# Patient Record
Sex: FEMALE | Race: WHITE | Employment: FULL TIME | URBAN - METROPOLITAN AREA
[De-identification: names, ages, dates, MRNs, and addresses within clinical notes are randomized per-mention and may not be internally consistent; named-entity substitution may affect disease eponyms.]

---

## 2017-02-23 PROCEDURE — 88142 CYTOPATH C/V THIN LAYER: CPT | Performed by: OBSTETRICS & GYNECOLOGY

## 2017-02-24 ENCOUNTER — HOSPITAL ENCOUNTER (OUTPATIENT)
Dept: LAB | Age: 52
Discharge: HOME | End: 2017-02-23
Payer: COMMERCIAL

## 2017-05-17 ENCOUNTER — HOSPITAL ENCOUNTER (OUTPATIENT)
Dept: LAB | Age: 52
Discharge: HOME | End: 2017-05-17
Payer: COMMERCIAL

## 2017-05-17 PROCEDURE — 88142 CYTOPATH C/V THIN LAYER: CPT | Performed by: NURSE PRACTITIONER

## 2017-08-30 ENCOUNTER — HOSPITAL ENCOUNTER (OUTPATIENT)
Dept: LAB | Age: 52
Discharge: HOME | End: 2017-08-30
Payer: COMMERCIAL

## 2017-08-30 PROCEDURE — 88142 CYTOPATH C/V THIN LAYER: CPT | Performed by: OBSTETRICS & GYNECOLOGY

## 2018-01-26 ENCOUNTER — HOSPITAL ENCOUNTER (OUTPATIENT)
Dept: LAB | Age: 53
Discharge: HOME | End: 2018-01-26
Payer: COMMERCIAL

## 2018-01-26 PROCEDURE — 88142 CYTOPATH C/V THIN LAYER: CPT | Performed by: OBSTETRICS & GYNECOLOGY

## 2018-02-21 ENCOUNTER — HOSPITAL ENCOUNTER (OUTPATIENT)
Dept: PHYSICAL THERAPY | Age: 53
Discharge: HOME | End: 2018-02-21
Payer: COMMERCIAL

## 2018-02-21 DIAGNOSIS — C54.1 ENDOMETRIAL CANCER (HCC): ICD-10-CM

## 2018-02-21 PROCEDURE — 97161 PT EVAL LOW COMPLEX 20 MIN: CPT

## 2018-02-21 NOTE — PROGRESS NOTES
Ambulatory/Rehab Services H2 Model Falls Risk Assessment    Risk Factor Pts. ·   Confusion/Disorientation/Impulsivity  []    4 ·   Symptomatic Depression  []   2 ·   Altered Elimination  []   1 ·   Dizziness/Vertigo  []   1 ·   Gender (Male)  []   1 ·   Any administered antiepileptics (anticonvulsants):  []   2 ·   Any administered benzodiazepines:  []   1 ·   Visual Impairment (specify):  []   1 ·   Portable Oxygen Use  []   1 ·   Orthostatic ? BP  []   1 ·   History of Recent Falls (within 3 mos.)  []   5     Ability to Rise from Chair (choose one) Pts. ·   Ability to rise in a single movement  [x]   0 ·   Pushes up, successful in one attempt  []   1 ·   Multiple attempts, but successful  []   3 ·   Unable to rise without assistance  []   4   Total: (5 or greater = High Risk) 0     Falls Prevention Plan:   []                Physical Limitations to Exercise (specify):   []                Mobility Assistance Device (type):   []                Exercise/Equipment Adaptation (specify):    ©2010 [employer] of [address] Patent #[id number].  Federal Law prohibits the replication, distribution or use without written permission from [employer]

## 2018-02-21 NOTE — THERAPY EVALUATION
Darion Pfeiffer  : 1965  Primary: [employer] [facility] [employer]*  Secondary:  [address]  at [facility]  [address] , [address], [place] [address]  [phone]   [phone]          OUTPATIENT PHYSICAL THERAPY:Initial Assessment 2018    ICD-10: Treatment Diagnosis: muscle weakness generalized M 62.81  Precautions/Allergies:   Review of patient's allergies indicates not on file. Fall Risk Score: 0 (? 5 = High Risk)  MD Orders: oncology rehab MEDICAL/REFERRING DIAGNOSIS:  Endometrial cancer ([facility] [address] [C54.1]    DATE OF ONSET: 2016  REFERRING PHYSICIAN: Keren Davila MD  RETURN PHYSICIAN APPOINTMENT: 18     INITIAL ASSESSMENT:  Ms. Deniz Chau presents following diagnosis of endometrial cancer in . She underwent a SHELLY/BSO 16. She present deconditioned and wanting to get in shape. She will benefit from a referral to [facility] and a dietician. The patient did not attend her evaluation in proper clothing for all testing. She rescheduled to complete the evaluation next week. PROBLEM LIST (Impacting functional limitations):  1. Decreased Strength  2. Decreased Activity Tolerance  3. Increased Fatigue  4. Decreased [place] with Home Exercise Program INTERVENTIONS PLANNED:  1. Home Exercise Program (HEP)  2. Therapeutic Exercise/Strengthening   TREATMENT PLAN:  Effective Dates: 18 TO 3/21/18. Frequency/Duration: 1 time a week for 4 weeks  GOALS: (Goals have been discussed and agreed upon with patient.)  Short-Term Functional Goals: Time Frame: 2 weeks  1. The patient will participate in a 6 minute walk test within 2 weeks. 2. The patient will participate in a TUG within 2 weeks. 3. The patient will be referred to oncology massage, yoga and dietician within 2 weeks. Discharge Goals: Time Frame: 4 weeks  1. The patient will transition to healthy Self within 4 weeks.   Rehabilitation Potential For Stated Goals: Good  Regarding Jalyn Coto therapy, I certify that the treatment plan above will be carried out by a therapist or under their direction. Thank you for this referral,  Doni Yang PT     Referring Physician Signature: Nathan Mack MD              Date            Do not click here        The information in this section was collected on 18 (except where otherwise noted). HISTORY:   History of Present Injury/Illness (Reason for Referral):  Post hysterectomy 2016, deconditioned  Past Medical History/Comorbidities:   Ms. Jossy Cifuentes  has a past medical history of Cancer ([facility] [address] (2016). Ms. Jossy Cifuentes  has a past surgical history that includes pr lap,tubal cautery; hx  section (); hx tubal ligation (); and hx denita and bso (). 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 Date Last Reviewed:  18   Number of Personal Factors/Comorbidities that affect the Plan of Care: 0: LOW COMPLEXITY   EXAMINATION:   ROM:          Within functional limits  Strength: Within functional limits           Functional Mobility:         Gait/Ambulation:  independent        Transfers:  independent  Balance:          intact   Body Structures Involved:  1. Muscles Body Functions Affected:  1. Neuromusculoskeletal Activities and Participation Affected:  1.  None   Number of elements (examined above) that affect the Plan of Care: 1-2: LOW COMPLEXITY   CLINICAL PRESENTATION:   Presentation: Stable and uncomplicated: LOW COMPLEXITY   CLINICAL DECISION MAKING: Outcome Measure: Tool Used: 6-MINUTE WALK TEST  Score:  Initial:   feet Most Recent: X feet (Date: -- )   Interpretation of Score: Normal range varies but is approximately 0602-9392 Feet      Distance walked:   feet               Baseline End of Test   Heart Rate      Dyspnea (Chris Scale)     Fatigue (Chris Scale)     SpO2     BP       Score 2133 [id number] [id number] [phone] 15-0   Modifier CH CI CJ CK CL CM CN         Tool Used: TINETTI  Score:  Initial:   Gait: 12/12  Balance: 16/16  TOTAL: 28/28 Most Recent:  Gait: /12  Balance: /16  TOTAL: /28   Interpretation of Score: The maximum score for the gait component is 12 points. The maximum score for the balance component is 16 points. The maximum total score is 28 points. In general, patients who score below 19 are at a high risk for falls. Patients who score in the range of 19-24 indicate that the patient has a risk for falls. Score 28 27-23 22-18 17-12 11-6 5-1 0   Modifier CH CI CJ CK CL CM CN         Tool Used: Timed Up and Go (TUG)  Score:  Initial:   seconds Most Recent: X seconds (Date: -- )   Interpretation of Score: The test measures, in seconds, the time taken by an individual to stand up from a standard arm chair (seat height 46 cm [18 in], arm height 65 cm [25.6 in]), walk a distance of 3 meters (118 in, approx 10 ft), turn, walk back to the chair and sit down. If the individual takes longer than 14 seconds to complete TUG, this indicates risk for falls.   Score 7 7.5-10.5 11-14 14.5-17.5 18-21 21.5-24.5 25+   Modifier CH CI CJ CK CL CM CN       Tool Used: ECOG Performance Survey Score  Score:  Initial:  0 Most Recent:      Interpretation of Score:   0 Fully active, able to carry on all pre-disease performance without restriction   1 Restricted in physically strenuous activity but ambulatory and able to carry out work of a light or sedentary nature, e.g., light house work, office work   2 Ambulatory and capable of all selfcare but unable to carry out any work activities. Up and about more than 50% of waking hours   3 Capable of only limited selfcare, confined to bed or chair more than 50% of waking hours   4 Completely disabled. Cannot carry on any selfcare. Totally confined to bed or chair   5 Dead        Medical Necessity:   · Patient is expected to demonstrate progress in strength and conditioning to improve overall function. Reason for Services/Other Comments:  · Patient continues to demonstrate capacity to improve strength and conditioning which will increase independence. Use of outcome tool(s) and clinical judgement create a POC that gives a: Clear prediction of patient's progress: LOW COMPLEXITY            TREATMENT:   (In addition to Assessment/Re-Assessment sessions the following treatments were rendered)  Pre-treatment Symptoms/Complaints:  The patient reports she wants to get into shape. Pain: Initial:     0 Post Session:  0     partial assessment. The patient was not dressed appropriately for full evaluation. Guzu  Treatment/Session Assessment:    · Response to Treatment:  Tolerated the assessment well. · Compliance with Program/Exercises: Will assess as treatment progresses. · Recommendations/Intent for next treatment session: \"Next visit will focus on finish assessment\". Refer to yoga, massage, dietician and [facility].   Total Treatment Duration:       Facundo Pugh, PT

## 2018-02-27 ENCOUNTER — HOSPITAL ENCOUNTER (OUTPATIENT)
Dept: PHYSICAL THERAPY | Age: 53
Discharge: HOME | End: 2018-02-27
Payer: COMMERCIAL

## 2018-02-27 PROCEDURE — 97110 THERAPEUTIC EXERCISES: CPT

## 2018-02-27 NOTE — PROGRESS NOTES
Joseph Lee  : 1965  Primary: [facility]ir[employer] Ang [employer]*  Secondary:  [address]  at [facility]  [address], [address], [place] [address]  [phone]   [phone]          OUTPATIENT PHYSICAL THERAPY:Daily Note 2018    ICD-10: Treatment Diagnosis: muscle weakness generalized M 62.81  Precautions/Allergies:   Review of patient's allergies indicates not on file. Fall Risk Score: 0 (? 5 = High Risk)  MD Orders: oncology rehab MEDICAL/REFERRING DIAGNOSIS:  Endometrial cancer ([facility] [address] [C54.1]    DATE OF ONSET: 2016  REFERRING PHYSICIAN: Efraín Mendieta MD  RETURN PHYSICIAN APPOINTMENT: 18     INITIAL ASSESSMENT:  Ms. Monique Juárez presents following diagnosis of endometrial cancer in . She underwent a SHELLY/BSO 16. She present deconditioned and wanting to get in shape. She will benefit from a referral to [facility] and a dietician. We will initiate strengthening and conditioning and transition her to healthy Self   PROBLEM LIST (Impacting functional limitations):  1. Decreased Strength  2. Decreased Activity Tolerance  3. Increased Fatigue  4. Decreased [place] with Home Exercise Program INTERVENTIONS PLANNED:  1. Home Exercise Program (HEP)  2. Therapeutic Exercise/Strengthening   TREATMENT PLAN:  Effective Dates: 18 TO 3/21/18. Frequency/Duration: 1 time a week for 4 weeks  GOALS: (Goals have been discussed and agreed upon with patient.)  Short-Term Functional Goals: Time Frame: 2 weeks  1. The patient will participate in a 6 minute walk test within 2 weeks. 2. The patient will participate in a TUG within 2 weeks. 3. The patient will be referred to oncology massage, yoga and dietician within 2 weeks. Discharge Goals: Time Frame: 4 weeks  1. The patient will transition to [facility] within 4 weeks.   Rehabilitation Potential For Stated Goals: Good  Regarding Clarita Fisher's therapy, I certify that the treatment plan above will be carried out by a therapist or under their direction. Thank you for this referral,  Amie Landaverde, PT         Do not click here        The information in this section was collected on 18 (except where otherwise noted). HISTORY:   History of Present Injury/Illness (Reason for Referral):  Post hysterectomy 2016, deconditioned  Past Medical History/Comorbidities:   Ms. Escalante  has a past medical history of Cancer ([facility] [address] (2016). Ms. Escalante  has a past surgical history that includes pr lap,tubal cautery; hx  section (); hx tubal ligation (); and hx shelly and bso (). 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 Date Last Reviewed:  18   Number of Personal Factors/Comorbidities that affect the Plan of Care: 0: LOW COMPLEXITY   EXAMINATION:   ROM:          Within functional limits  Strength:  5/5 x 4 etremities           Functional Mobility:         Gait/Ambulation:  independent        Transfers:  independent  Balance:          intact   Body Structures Involved:  1. Muscles Body Functions Affected:  1. Neuromusculoskeletal Activities and Participation Affected:  1. None   Number of elements (examined above) that affect the Plan of Care: 1-2: LOW COMPLEXITY   CLINICAL PRESENTATION:   Presentation: Stable and uncomplicated: LOW COMPLEXITY   CLINICAL DECISION MAKING:   Outcome Measure:    Tool Used: 6-MINUTE WALK TEST  Score:  Initial: 1700  feet Most Recent: X feet (Date: -- )   Interpretation of Score: Normal range varies but is approximately 7074-0012 Feet      Distance walked: 1700  feet               Baseline End of Test   Heart Rate 92  105   Dyspnea (Chris Scale)     Fatigue (Chris Scale) 3-4/10 5/10   SpO2 99 98   /110 automatic 161/103 automatic  160/100 manual  5 min rest  121/92  Advised the patient to discuss this with her physician     Score 2133 2561-5117 1076-1851 [phone] 15-0   Modifier CH CI CJ CK CL CM CN         Tool Used: TINETTI  Score:  Initial:   Gait: 12/12  Balance: 16/16  TOTAL: 28/28 Most Recent:  Gait: /12  Balance: /16  TOTAL: /28   Interpretation of Score: The maximum score for the gait component is 12 points. The maximum score for the balance component is 16 points. The maximum total score is 28 points. In general, patients who score below 19 are at a high risk for falls. Patients who score in the range of 19-24 indicate that the patient has a risk for falls. Score 28 27-23 22-18 17-12 11-6 5-1 0   Modifier CH CI CJ CK CL CM CN         Tool Used: Timed Up and Go (TUG)  Score:  Initial: 7  seconds Most Recent: X seconds (Date: -- )   Interpretation of Score: The test measures, in seconds, the time taken by an individual to stand up from a standard arm chair (seat height 46 cm [18 in], arm height 65 cm [25.6 in]), walk a distance of 3 meters (118 in, approx 10 ft), turn, walk back to the chair and sit down. If the individual takes longer than 14 seconds to complete TUG, this indicates risk for falls.   Score 7 7.5-10.5 11-14 14.5-17.5 18-21 21.5-24.5 25+   Modifier CH CI CJ CK CL CM CN       Tool Used: ECOG Performance Survey Score  Score:  Initial:  0 Most Recent:      Interpretation of Score:   0 Fully active, able to carry on all pre-disease performance without restriction   1 Restricted in physically strenuous activity but ambulatory and able to carry out work of a light or sedentary nature, e.g., light house work, office work   2 Ambulatory and capable of all selfcare but unable to carry out any work activities. Up and about more than 50% of waking hours   3 Capable of only limited selfcare, confined to bed or chair more than 50% of waking hours   4 Completely disabled. Cannot carry on any selfcare. Totally confined to bed or chair   5 Dead        Medical Necessity:   · Patient is expected to demonstrate progress in strength and conditioning to improve overall function. Reason for Services/Other Comments:  · Patient continues to demonstrate capacity to improve strength and conditioning which will increase independence. Use of outcome tool(s) and clinical judgement create a POC that gives a: Clear prediction of patient's progress: LOW COMPLEXITY            TREATMENT:   (In addition to Assessment/Re-Assessment sessions the following treatments were rendered)  Pre-treatment Symptoms/Complaints:  The patient reports she wants to get into shape. Pain: Initial:     0 Post Session:  0   59 minutes  as below    Nustep level 2 x 5 min mets 2.4 spm 73 O2 98   UBE level 1 x 4 min O2 98    Sit to stand x 10 reps O298    6 minute walk, TUG  Given information on yoga, massage and dietician  Fatigue 7/10    [facility] [facility]  Treatment/Session Assessment:    · Response to Treatment:  Tolerated the treatment well. · Compliance with Program/Exercises: Will assess as treatment progresses. · Recommendations/Intent for next treatment session: \"Next visit will focus on advancements to more challenging activities\". Refer to yoga, massage, dietician and [facility].   Total Treatment Duration:  PT Patient Time In/Time Out  Time In: 1026  Time Out: [address], PT

## 2018-03-06 ENCOUNTER — HOSPITAL ENCOUNTER (OUTPATIENT)
Dept: PHYSICAL THERAPY | Age: 53
Discharge: HOME | End: 2018-03-06
Payer: COMMERCIAL

## 2018-03-06 PROCEDURE — 97110 THERAPEUTIC EXERCISES: CPT

## 2018-03-06 NOTE — PROGRESS NOTES
Som Vasquez  : 1965  Primary: [employer] Hurst [employer]*  Secondary:  [address] at [address]  [address] , [address], [place] [address]  [phone]   [phone]          OUTPATIENT PHYSICAL THERAPY:Daily Note 3/6/2018    ICD-10: Treatment Diagnosis: muscle weakness generalized M 62.81  Precautions/Allergies:   Review of patient's allergies indicates no known allergies. Fall Risk Score: 0 (? 5 = High Risk)  MD Orders: oncology rehab MEDICAL/REFERRING DIAGNOSIS:  Endometrial cancer ([facility] [address] [C54.1]    DATE OF ONSET: 2016  REFERRING PHYSICIAN: iPng Torres MD  RETURN PHYSICIAN APPOINTMENT: 18     INITIAL ASSESSMENT:  Ms. Armin Maravilla presents following diagnosis of endometrial cancer in . She underwent a SHELLY/BSO 16. She present deconditioned and wanting to get in shape. She will benefit from a referral to [facility] and a dietician. We will initiate strengthening and conditioning and transition her to healthy Self   PROBLEM LIST (Impacting functional limitations):  1. Decreased Strength  2. Decreased Activity Tolerance  3. Increased Fatigue  4. Decreased [place] with Home Exercise Program INTERVENTIONS PLANNED:  1. Home Exercise Program (HEP)  2. Therapeutic Exercise/Strengthening   TREATMENT PLAN:  Effective Dates: 18 TO 3/21/18. Frequency/Duration: 1 time a week for 4 weeks  GOALS: (Goals have been discussed and agreed upon with patient.)  Short-Term Functional Goals: Time Frame: 2 weeks  1. The patient will participate in a 6 minute walk test within 2 weeks. 2. The patient will participate in a TUG within 2 weeks. 3. The patient will be referred to oncology massage, yoga and dietician within 2 weeks. Discharge Goals: Time Frame: 4 weeks  1. The patient will transition to [facility] within 4 weeks.   Rehabilitation Potential For Stated Goals: Good  Regarding Clarita Fisher's therapy, I certify that the treatment plan above will be carried out by a therapist or under their direction. Thank you for this referral,  Amie Landaverde, PT         Do not click here        The information in this section was collected on 18 (except where otherwise noted). HISTORY:   History of Present Injury/Illness (Reason for Referral):  Post hysterectomy 2016, deconditioned  Past Medical History/Comorbidities:   Ms. Thai Garcia  has a past medical history of Cancer ([facility] [address] (2016). Ms. Thai Garcia  has a past surgical history that includes pr lap,tubal cautery; hx  section (); hx tubal ligation (); hx shelly and bso (); and hx colonoscopy (). 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 Date Last Reviewed:  18   Number of Personal Factors/Comorbidities that affect the Plan of Care: 0: LOW COMPLEXITY   EXAMINATION:   ROM:          Within functional limits  Strength:  5/5 x 4 etremities           Functional Mobility:         Gait/Ambulation:  independent        Transfers:  independent  Balance:          intact   Body Structures Involved:  1. Muscles Body Functions Affected:  1. Neuromusculoskeletal Activities and Participation Affected:  1. None   Number of elements (examined above) that affect the Plan of Care: 1-2: LOW COMPLEXITY   CLINICAL PRESENTATION:   Presentation: Stable and uncomplicated: LOW COMPLEXITY   CLINICAL DECISION MAKING:   Outcome Measure:    Tool Used: 6-MINUTE WALK TEST  Score:  Initial: 1700  feet Most Recent: X feet (Date: -- )   Interpretation of Score: Normal range varies but is approximately 3096-5451 Feet      Distance walked: 1700  feet               Baseline End of Test   Heart Rate 92  105   Dyspnea (Chris Scale)     Fatigue (Chris Scale) 3-4/10 5/10   SpO2 99 98   /110 automatic 161/103 automatic  160/100 manual  5 min rest  121/92  Advised the patient to discuss this with her physician     Score 2133 [id number] [id number] [phone] 426-16 15-0   Modifier CH CI CJ CK CL CM CN         Tool Used: TINETTI  Score:  Initial:   Gait: 12/12  Balance: 16/16  TOTAL: 28/28 Most Recent:  Gait: /12  Balance: /16  TOTAL: /28   Interpretation of Score: The maximum score for the gait component is 12 points. The maximum score for the balance component is 16 points. The maximum total score is 28 points. In general, patients who score below 19 are at a high risk for falls. Patients who score in the range of 19-24 indicate that the patient has a risk for falls. Score 28 27-23 22-18 17-12 11-6 5-1 0   Modifier CH CI CJ CK CL CM CN         Tool Used: Timed Up and Go (TUG)  Score:  Initial: 7  seconds Most Recent: X seconds (Date: -- )   Interpretation of Score: The test measures, in seconds, the time taken by an individual to stand up from a standard arm chair (seat height 46 cm [18 in], arm height 65 cm [25.6 in]), walk a distance of 3 meters (118 in, approx 10 ft), turn, walk back to the chair and sit down. If the individual takes longer than 14 seconds to complete TUG, this indicates risk for falls.   Score 7 7.5-10.5 11-14 14.5-17.5 18-21 21.5-24.5 25+   Modifier CH CI CJ CK CL CM CN       Tool Used: ECOG Performance Survey Score  Score:  Initial:  0 Most Recent:      Interpretation of Score:   0 Fully active, able to carry on all pre-disease performance without restriction   1 Restricted in physically strenuous activity but ambulatory and able to carry out work of a light or sedentary nature, e.g., light house work, office work   2 Ambulatory and capable of all selfcare but unable to carry out any work activities. Up and about more than 50% of waking hours   3 Capable of only limited selfcare, confined to bed or chair more than 50% of waking hours   4 Completely disabled. Cannot carry on any selfcare. Totally confined to bed or chair   5 Dead        Medical Necessity:   · Patient is expected to demonstrate progress in strength and conditioning to improve overall function. Reason for Services/Other Comments:  · Patient continues to demonstrate capacity to improve strength and conditioning which will increase independence. Use of outcome tool(s) and clinical judgement create a POC that gives a: Clear prediction of patient's progress: LOW COMPLEXITY            TREATMENT:   (In addition to Assessment/Re-Assessment sessions the following treatments were rendered)  Pre-treatment Symptoms/Complaints:  The patient reports she wants to get into shape. Pain: Initial:     0 Post Session:  0   Fatigue 0-1/10  as below  O298 HR 83  /108? (Automatic) The patient reports her blood pressure was very normal when she saw the doctor. 1300' x 1 O2 98   Nustep level 2 x 7 min   UBE level 1 x 4 min   1300' x 1  Sit to stand x 10 reps  Long arc quads x 10 reps with 5 count hold  Bilateral upper extremity with 2# x 10 reps forward flexion, abduction, bicep curls over head triceps extension  1300' x 1    Fatigue 3/10    [employer] Portal  Treatment/Session Assessment:    · Response to Treatment:  Tolerated the treatment well. Participated well. · Compliance with Program/Exercises: Will assess as treatment progresses. · Recommendations/Intent for next treatment session: \"Next visit will focus on advancements to more challenging activities\". Refer to [facility].   Total Treatment Duration: 45 min  PT Patient Time In/Time Out  Time In: 0848  Time Out: 1359    Amalia Puga, PT

## 2018-03-07 ENCOUNTER — HOSPITAL ENCOUNTER (OUTPATIENT)
Dept: PHYSICAL THERAPY | Age: 53
Discharge: HOME | End: 2018-03-07
Payer: COMMERCIAL

## 2018-03-07 PROCEDURE — 97110 THERAPEUTIC EXERCISES: CPT

## 2018-03-07 NOTE — PROGRESS NOTES
Lurlene Gowers  : 1965  Primary: [facility] [employer]*  Secondary:  [address]  [place] [facility]  [address], [address], [place]  [phone]   [phone]          OUTPATIENT PHYSICAL THERAPY:Daily Note 3/7/2018    ICD-10: Treatment Diagnosis: muscle weakness generalized M 62.81  Precautions/Allergies:   Review of patient's allergies indicates no known allergies. Fall Risk Score: 0 (? 5 = High Risk)  MD Orders: oncology rehab MEDICAL/REFERRING DIAGNOSIS:  Endometrial cancer ([facility] [address] [C54.1]    DATE OF ONSET: 2016  REFERRING PHYSICIAN: Rosalinda Perales MD  RETURN PHYSICIAN APPOINTMENT: 18     INITIAL ASSESSMENT:  Ms. Braulio Olvera presents following diagnosis of endometrial cancer in . She underwent a SHELLY/BSO 16. She present deconditioned and wanting to get in shape. She will benefit from a referral to [facility] and a dietician. We will initiate strengthening and conditioning and transition her to healthy Self   PROBLEM LIST (Impacting functional limitations):  1. Decreased Strength  2. Decreased Activity Tolerance  3. Increased Fatigue  4. Decreased [place] [facility] with Home Exercise Program INTERVENTIONS PLANNED:  1. Home Exercise Program (HEP)  2. Therapeutic Exercise/Strengthening   TREATMENT PLAN:  Effective Dates: 18 TO 3/21/18. Frequency/Duration: 1 time a week for 4 weeks  GOALS: (Goals have been discussed and agreed upon with patient.)  Short-Term Functional Goals: Time Frame: 2 weeks  1. The patient will participate in a 6 minute walk test within 2 weeks. 2. The patient will participate in a TUG within 2 weeks. 3. The patient will be referred to oncology massage, yoga and dietician within 2 weeks. Discharge Goals: Time Frame: 4 weeks  1. The patient will transition to [facility] within 4 weeks.   Rehabilitation Potential For Stated Goals: Good  Regarding Clarita Fisher's therapy, I certify that the treatment plan above will be carried out by a therapist or under their direction. Thank you for this referral,  Amie Landaverde, PT         Do not click here        The information in this section was collected on 18 (except where otherwise noted). HISTORY:   History of Present Injury/Illness (Reason for Referral):  Post hysterectomy 2016, deconditioned  Past Medical History/Comorbidities:   Ms. Irving Hendrix  has a past medical history of Cancer ([facility] [address] (2016). Ms. Irving Hendrix  has a past surgical history that includes pr lap,tubal cautery; hx  section (); hx tubal ligation (); hx shelly and bso (); and hx colonoscopy (). 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 Date Last Reviewed:  18   Number of Personal Factors/Comorbidities that affect the Plan of Care: 0: LOW COMPLEXITY   EXAMINATION:   ROM:          Within functional limits  Strength:  5/5 x 4 etremities           Functional Mobility:         Gait/Ambulation:  independent        Transfers:  independent  Balance:          intact   Body Structures Involved:  1. Muscles Body Functions Affected:  1. Neuromusculoskeletal Activities and Participation Affected:  1. None   Number of elements (examined above) that affect the Plan of Care: 1-2: LOW COMPLEXITY   CLINICAL PRESENTATION:   Presentation: Stable and uncomplicated: LOW COMPLEXITY   CLINICAL DECISION MAKING:   Outcome Measure:    Tool Used: 6-MINUTE WALK TEST  Score:  Initial: 1700  feet Most Recent: X feet (Date: -- )   Interpretation of Score: Normal range varies but is approximately 2790-3959 Feet      Distance walked: 1700  feet               Baseline End of Test   Heart Rate 92  105   Dyspnea (Chris Scale)     Fatigue (Chris Scale) 3-4/10 5/10   SpO2 99 98   /110 automatic 161/103 automatic  160/100 manual  5 min rest  121/92  Advised the patient to discuss this with her physician     Score 2133 [id number] 2558-1109 [phone] 15-0   Modifier CH CI CJ CK CL CM CN         Tool Used: TINETTI  Score:  Initial:   Gait: 12/12  Balance: 16/16  TOTAL: 28/28 Most Recent:  Gait: /12  Balance: /16  TOTAL: /28   Interpretation of Score: The maximum score for the gait component is 12 points. The maximum score for the balance component is 16 points. The maximum total score is 28 points. In general, patients who score below 19 are at a high risk for falls. Patients who score in the range of 19-24 indicate that the patient has a risk for falls. Score 28 27-23 22-18 17-12 11-6 5-1 0   Modifier CH CI CJ CK CL CM CN         Tool Used: Timed Up and Go (TUG)  Score:  Initial: 7  seconds Most Recent: X seconds (Date: -- )   Interpretation of Score: The test measures, in seconds, the time taken by an individual to stand up from a standard arm chair (seat height 46 cm [18 in], arm height 65 cm [25.6 in]), walk a distance of 3 meters (118 in, approx 10 ft), turn, walk back to the chair and sit down. If the individual takes longer than 14 seconds to complete TUG, this indicates risk for falls.   Score 7 7.5-10.5 11-14 14.5-17.5 18-21 21.5-24.5 25+   Modifier CH CI CJ CK CL CM CN       Tool Used: ECOG Performance Survey Score  Score:  Initial:  0 Most Recent:      Interpretation of Score:   0 Fully active, able to carry on all pre-disease performance without restriction   1 Restricted in physically strenuous activity but ambulatory and able to carry out work of a light or sedentary nature, e.g., light house work, office work   2 Ambulatory and capable of all selfcare but unable to carry out any work activities. Up and about more than 50% of waking hours   3 Capable of only limited selfcare, confined to bed or chair more than 50% of waking hours   4 Completely disabled. Cannot carry on any selfcare. Totally confined to bed or chair   5 Dead        Medical Necessity:   · Patient is expected to demonstrate progress in strength and conditioning to improve overall function. Reason for Services/Other Comments:  · Patient continues to demonstrate capacity to improve strength and conditioning which will increase independence. Use of outcome tool(s) and clinical judgement create a POC that gives a: Clear prediction of patient's progress: LOW COMPLEXITY            TREATMENT:   (In addition to Assessment/Re-Assessment sessions the following treatments were rendered)  Pre-treatment Symptoms/Complaints:  The patient reports she can tell she has been exercising. Pain: Initial:     0 Post Session:  0   Fatigue 2/10  as below  O293 HR 97  /102 (Automatic)   1300' x 1 O2 98   Nustep level 2 x 7 min spm 81  UBE level 1 x 4 min   1300' x 1  Sit to stand x 10 reps  Long arc quads x 10 reps with 5 count hold  Bilateral upper extremity with 2# x 10 reps forward flexion, abduction, bicep curls over head triceps extension  1300' x 1  /102  Fatigue 3.5/10    [employer] Portal  Treatment/Session Assessment:    · Response to Treatment:  Tolerated the treatment well. Participated well. · Compliance with Program/Exercises: Will assess as treatment progresses. · Recommendations/Intent for next treatment session: \"Next visit will focus on advancements to more challenging activities\". Refer to [facility].   Total Treatment Duration: 44 min  PT Patient Time In/Time Out  Time In: 0915  Time Out: 5617    Jorge Isaacs PT

## 2018-03-12 ENCOUNTER — HOSPITAL ENCOUNTER (OUTPATIENT)
Dept: PHYSICAL THERAPY | Age: 53
Discharge: HOME | End: 2018-03-12
Payer: COMMERCIAL

## 2018-03-12 PROCEDURE — 97110 THERAPEUTIC EXERCISES: CPT

## 2018-03-12 NOTE — PROGRESS NOTES
Jasmin Hawkins  : 1965  Primary: Karan Jimenes*  Secondary:  [address]  at [facility]  [address], [address], [place] [address]  [phone]   [phone]          OUTPATIENT PHYSICAL THERAPY:Daily Note 3/12/2018    ICD-10: Treatment Diagnosis: muscle weakness generalized M 62.81  Precautions/Allergies:   Review of patient's allergies indicates no known allergies. Fall Risk Score: 0 (? 5 = High Risk)  MD Orders: oncology rehab MEDICAL/REFERRING DIAGNOSIS:  Endometrial cancer ([facility] [address] [C54.1]    DATE OF ONSET: 2016  REFERRING PHYSICIAN: Ashleigh Pearson MD  RETURN PHYSICIAN APPOINTMENT: 18     INITIAL ASSESSMENT:  Ms. Marilee Snowden presents following diagnosis of endometrial cancer in . She underwent a SHELLY/BSO 16. She present deconditioned and wanting to get in shape. She will benefit from a referral to [facility] and a dietician. We will initiate strengthening and conditioning and transition her to healthy Self   PROBLEM LIST (Impacting functional limitations):  1. Decreased Strength  2. Decreased Activity Tolerance  3. Increased Fatigue  4. Decreased [place] with Home Exercise Program INTERVENTIONS PLANNED:  1. Home Exercise Program (HEP)  2. Therapeutic Exercise/Strengthening   TREATMENT PLAN:  Effective Dates: 18 TO 3/21/18. Frequency/Duration: 1 time a week for 4 weeks  GOALS: (Goals have been discussed and agreed upon with patient.)  Short-Term Functional Goals: Time Frame: 2 weeks  1. The patient will participate in a 6 minute walk test within 2 weeks. 2. The patient will participate in a TUG within 2 weeks. 3. The patient will be referred to oncology massage, yoga and dietician within 2 weeks. Discharge Goals: Time Frame: 4 weeks  1. The patient will transition to [facility] within 4 weeks.   Rehabilitation Potential For Stated Goals: Good  Regarding Clarita Fisher's therapy, I certify that the treatment plan above will be carried out by a therapist or under their direction. Thank you for this referral,  Amie Landaverde, PT         Do not click here        The information in this section was collected on 18 (except where otherwise noted). HISTORY:   History of Present Injury/Illness (Reason for Referral):  Post hysterectomy 2016, deconditioned  Past Medical History/Comorbidities:   Ms. Bj Starks  has a past medical history of Cancer ([facility] [address] (2016). Ms. Bj Starks  has a past surgical history that includes pr lap,tubal cautery; hx  section (); hx tubal ligation (); hx shelly and bso (); and hx colonoscopy (). 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 Date Last Reviewed:  18   Number of Personal Factors/Comorbidities that affect the Plan of Care: 0: LOW COMPLEXITY   EXAMINATION:   ROM:          Within functional limits  Strength:  5/5 x 4 etremities           Functional Mobility:         Gait/Ambulation:  independent        Transfers:  independent  Balance:          intact   Body Structures Involved:  1. Muscles Body Functions Affected:  1. Neuromusculoskeletal Activities and Participation Affected:  1. None   Number of elements (examined above) that affect the Plan of Care: 1-2: LOW COMPLEXITY   CLINICAL PRESENTATION:   Presentation: Stable and uncomplicated: LOW COMPLEXITY   CLINICAL DECISION MAKING:   Outcome Measure:    Tool Used: 6-MINUTE WALK TEST  Score:  Initial: 1700  feet Most Recent: X feet (Date: -- )   Interpretation of Score: Normal range varies but is approximately 4352-9261 Feet      Distance walked: 1700  feet               Baseline End of Test   Heart Rate 92  105   Dyspnea (Chris Scale)     Fatigue (Chris Scale) 3-4/10 5/10   SpO2 99 98   /110 automatic 161/103 automatic  160/100 manual  5 min rest  121/92  Advised the patient to discuss this with her physician     Score 2133 [id number] [phone] 15-0   Modifier CH CI CJ CK CL CM CN         Tool Used: TINETTI  Score:  Initial:   Gait: 12/12  Balance: 16/16  TOTAL: 28/28 Most Recent:  Gait: /12  Balance: /16  TOTAL: /28   Interpretation of Score: The maximum score for the gait component is 12 points. The maximum score for the balance component is 16 points. The maximum total score is 28 points. In general, patients who score below 19 are at a high risk for falls. Patients who score in the range of 19-24 indicate that the patient has a risk for falls. Score 28 27-23 22-18 17-12 11-6 5-1 0   Modifier CH CI CJ CK CL CM CN         Tool Used: Timed Up and Go (TUG)  Score:  Initial: 7  seconds Most Recent: X seconds (Date: -- )   Interpretation of Score: The test measures, in seconds, the time taken by an individual to stand up from a standard arm chair (seat height 46 cm [18 in], arm height 65 cm [25.6 in]), walk a distance of 3 meters (118 in, approx 10 ft), turn, walk back to the chair and sit down. If the individual takes longer than 14 seconds to complete TUG, this indicates risk for falls.   Score 7 7.5-10.5 11-14 14.5-17.5 18-21 21.5-24.5 25+   Modifier CH CI CJ CK CL CM CN       Tool Used: ECOG Performance Survey Score  Score:  Initial:  0 Most Recent:      Interpretation of Score:   0 Fully active, able to carry on all pre-disease performance without restriction   1 Restricted in physically strenuous activity but ambulatory and able to carry out work of a light or sedentary nature, e.g., light house work, office work   2 Ambulatory and capable of all selfcare but unable to carry out any work activities. Up and about more than 50% of waking hours   3 Capable of only limited selfcare, confined to bed or chair more than 50% of waking hours   4 Completely disabled. Cannot carry on any selfcare. Totally confined to bed or chair   5 Dead        Medical Necessity:   · Patient is expected to demonstrate progress in strength and conditioning to improve overall function. Reason for Services/Other Comments:  · Patient continues to demonstrate capacity to improve strength and conditioning which will increase independence. Use of outcome tool(s) and clinical judgement create a POC that gives a: Clear prediction of patient's progress: LOW COMPLEXITY            TREATMENT:   (In addition to Assessment/Re-Assessment sessions the following treatments were rendered)  Pre-treatment Symptoms/Complaints:  The patient reports she is running late today and has been rushing this morning. Pain: Initial:     0 Post Session:  0   Fatigue 2/10  as below  O2 100   /102 (Automatic)   1)1300' x 1 O2 98   2)Nustep level 2 x 9 min spm 81  3)UBE level 1 x 4 min   4) 1300' x 1  5)Sit to stand x 10 reps  6)Long arc quads x 10 reps with 5 count hold  7)Bilateral upper extremity with 3# x 10 reps forward flexion, abduction, bicep curls over head triceps extension  8)1300' x 1 O2 97   /97    Fatigue 4-5/10    [employer] Portal  Treatment/Session Assessment:    · Response to Treatment:  Tolerated the treatment well. Participated well. Able to increase Nustep minutes and resistance with bilateral upper extremities. · Compliance with Program/Exercises: Will assess as treatment progresses. · Recommendations/Intent for next treatment session: \"Next visit will focus on advancements to more challenging activities\". Refer to [facility].   Total Treatment Duration: 45 min  PT Patient Time In/Time Out  Time In: 5506  Time Out: [address] [facility], PT

## 2018-03-14 ENCOUNTER — HOSPITAL ENCOUNTER (OUTPATIENT)
Dept: PHYSICAL THERAPY | Age: 53
Discharge: HOME | End: 2018-03-14
Payer: COMMERCIAL

## 2018-03-14 PROCEDURE — 97110 THERAPEUTIC EXERCISES: CPT

## 2018-03-14 NOTE — PROGRESS NOTES
Roxane Simeon  : 1965  Primary: Stan [facility]anes [employer]*  Secondary:  [address]  at [facility]  [address], [address], [place] [address]  [phone]   [phone]          OUTPATIENT PHYSICAL [facility] 3/14/2018    ICD-10: Treatment Diagnosis: muscle weakness generalized M 62.81  Precautions/Allergies:   Review of patient's allergies indicates no known allergies. Fall Risk Score: 0 (? 5 = High Risk)  MD Orders: oncology rehab MEDICAL/REFERRING DIAGNOSIS:  Endometrial cancer ([facility] [address] [C54.1]    DATE OF ONSET: 2016  REFERRING PHYSICIAN: Yesika Harley MD  RETURN PHYSICIAN APPOINTMENT: 18     INITIAL ASSESSMENT:  Ms. Elroy Hurtado presents following diagnosis of endometrial cancer in . She underwent a SHELLY/BSO 16. She present deconditioned and wanting to get in shape. She will benefit from a referral to [facility] and a dietician. We will initiate strengthening and conditioning and transition her to healthy Self  3/14/18: The patient has met her goals. She is ready to transition to [facility]. We will make the referral.   PROBLEM LIST (Impacting functional limitations):  1. Decreased Strength  2. Decreased Activity Tolerance  3. Increased Fatigue  4. Decreased [place] with Home Exercise Program INTERVENTIONS PLANNED:  1. Home Exercise Program (HEP)  2. Therapeutic Exercise/Strengthening   TREATMENT PLAN:  Effective Dates: 18 TO 3/21/18. Frequency/Duration: 1 time a week for 4 weeks  GOALS: (Goals have been discussed and agreed upon with patient.)  Short-Term Functional Goals: Time Frame: 2 weeks  1. The patient will participate in a 6 minute walk test within 2 weeks. Met   2. The patient will participate in a TUG within 2 weeks. Met   3. The patient will be referred to oncology massage, yoga and dietician within 2 weeks. Met   Discharge Goals: Time Frame: 4 weeks  1.  The patient will transition to [facility] within 4 weeks.  Met   Rehabilitation Potential For Stated Goals: Good  Regarding Clarita Fisher's therapy, I certify that the treatment plan above will be carried out by a therapist or under their direction. Thank you for this referral,  Amie Landaverde, PT         Do not click here        The information in this section was collected on 18 (except where otherwise noted). HISTORY:   History of Present Injury/Illness (Reason for Referral):  Post hysterectomy 2016, deconditioned  Past Medical History/Comorbidities:   Ms. Escalante  has a past medical history of Cancer ([facility] [address] (2016). Ms. Escalante  has a past surgical history that includes pr lap,tubal cautery; hx  section (); hx tubal ligation (); hx shelly and bso (); and hx colonoscopy (). 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 Date Last Reviewed:  18   Number of Personal Factors/Comorbidities that affect the Plan of Care: 0: LOW COMPLEXITY   EXAMINATION:   ROM:          Within functional limits  Strength:  5/5 x 4 etremities           Functional Mobility:         Gait/Ambulation:  independent        Transfers:  independent  Balance:          intact   Body Structures Involved:  1. Muscles Body Functions Affected:  1. Neuromusculoskeletal Activities and Participation Affected:  1.  None   Number of elements (examined above) that affect the Plan of Care: 1-2: LOW COMPLEXITY   CLINICAL PRESENTATION:   Presentation: Stable and uncomplicated: LOW COMPLEXITY   CLINICAL DECISION MAKING:   Outcome Measure: Tool Used: 6-MINUTE WALK TEST  Score:  Initial: 1700  feet Most Recent: X feet (Date: -- )   Interpretation of Score: Normal range varies but is approximately 6556-0226 Feet      Distance walked: 1700  feet               Baseline End of Test   Heart Rate 92  105   Dyspnea (Chris Scale)     Fatigue (Chris Scale) 3-4/10 5/10   SpO2 99 98   /110 automatic 161/103 automatic  160/100 manual  5 min rest  121/92  Advised the patient to discuss this with her physician     Score 2133 [id number] 1125-5082 [phone] 15-0   Modifier CH CI CJ CK CL CM CN         Tool Used: TINETTI  Score:  Initial:   Gait: 12/12  Balance: 16/16  TOTAL: 28/28 Most Recent:  Gait: /12  Balance: /16  TOTAL: /28   Interpretation of Score: The maximum score for the gait component is 12 points. The maximum score for the balance component is 16 points. The maximum total score is 28 points. In general, patients who score below 19 are at a high risk for falls. Patients who score in the range of 19-24 indicate that the patient has a risk for falls. Score 28 27-23 22-18 17-12 11-6 5-1 0   Modifier CH CI CJ CK CL CM CN         Tool Used: Timed Up and Go (TUG)  Score:  Initial: 7  seconds Most Recent: X seconds (Date: -- )   Interpretation of Score: The test measures, in seconds, the time taken by an individual to stand up from a standard arm chair (seat height 46 cm [18 in], arm height 65 cm [25.6 in]), walk a distance of 3 meters (118 in, approx 10 ft), turn, walk back to the chair and sit down. If the individual takes longer than 14 seconds to complete TUG, this indicates risk for falls.   Score 7 7.5-10.5 11-14 14.5-17.5 18-21 21.5-24.5 25+   Modifier CH CI CJ CK CL CM CN       Tool Used: ECOG Performance Survey Score  Score:  Initial:  0 Most Recent:      Interpretation of Score:   0 Fully active, able to carry on all pre-disease performance without restriction   1 Restricted in physically strenuous activity but ambulatory and able to carry out work of a light or sedentary nature, e.g., light house work, office work   2 Ambulatory and capable of all selfcare but unable to carry out any work activities. Up and about more than 50% of waking hours   3 Capable of only limited selfcare, confined to bed or chair more than 50% of waking hours   4 Completely disabled. Cannot carry on any selfcare. Totally confined to bed or chair   5 Dead        Medical Necessity:   · Patient is expected to demonstrate progress in strength and conditioning to improve overall function. Reason for Services/Other Comments:  · Patient continues to demonstrate capacity to improve strength and conditioning which will increase independence. Use of outcome tool(s) and clinical judgement create a POC that gives a: Clear prediction of patient's progress: LOW COMPLEXITY            TREATMENT:   (In addition to Assessment/Re-Assessment sessions the following treatments were rendered)  Pre-treatment Symptoms/Complaints:  The patient reports she is good. She reports she is ready to transition to [facility]. Pain: Initial: slight pain in heel.    0 Post Session:  0   Fatigue 1/10  as below  O2 99 HR 79  /98 (Automatic)   1)1300' x 1 O2   3)Nustep level 3 x 9 min spm 87  Mets 3.2  2)UBE level 1 x 4 min   4) 1300' x 1  5)Sit to stand x 10 reps  6)Long arc quads x 10 reps with 5 count hold  7)Bilateral upper extremity with 3# x 10 reps forward flexion, abduction, bicep curls over head triceps extension  8)1300' x 1 O2 97       Fatigue 2/10    [employer] Portal  Treatment/Session Assessment:    · Response to Treatment:  Tolerated the treatment well. Participated well. Able to increase Nustep resistance. · Compliance with Program/Exercises: Will assess as treatment progresses. · Recommendations/Intent for next treatment session: \"  Refer to [facility].   Total Treatment Duration: 43 min  PT Patient Time In/Time Out  Time In: 0850  Time Out: 0933    Doni Yang, PT

## 2018-05-25 ENCOUNTER — HOSPITAL ENCOUNTER (OUTPATIENT)
Dept: MAMMOGRAPHY | Age: 53
Discharge: HOME | End: 2018-05-25
Attending: FAMILY MEDICINE
Payer: COMMERCIAL

## 2018-05-25 DIAGNOSIS — Z00.00 PHYSICAL EXAM, ANNUAL: ICD-10-CM

## 2018-05-25 DIAGNOSIS — Z12.39 SCREENING FOR BREAST CANCER: ICD-10-CM

## 2018-05-25 PROCEDURE — 77080 DXA BONE DENSITY AXIAL: CPT

## 2018-05-25 PROCEDURE — 77067 SCR MAMMO BI INCL CAD: CPT

## 2018-07-25 ENCOUNTER — HOSPITAL ENCOUNTER (OUTPATIENT)
Dept: LAB | Age: 53
Discharge: HOME | End: 2018-07-25
Payer: COMMERCIAL

## 2018-07-25 PROCEDURE — 88142 CYTOPATH C/V THIN LAYER: CPT | Performed by: NURSE PRACTITIONER

## 2019-01-25 ENCOUNTER — HOSPITAL ENCOUNTER (OUTPATIENT)
Dept: LAB | Age: 54
Discharge: HOME | End: 2019-01-25
Payer: COMMERCIAL

## 2019-01-25 PROCEDURE — 88142 CYTOPATH C/V THIN LAYER: CPT

## 2019-07-26 ENCOUNTER — HOSPITAL ENCOUNTER (OUTPATIENT)
Dept: LAB | Age: 54
Discharge: HOME | End: 2019-07-26
Payer: COMMERCIAL

## 2019-07-26 PROCEDURE — 88142 CYTOPATH C/V THIN LAYER: CPT

## 2020-02-21 ENCOUNTER — HOSPITAL ENCOUNTER (OUTPATIENT)
Dept: LAB | Age: 55
Discharge: HOME | End: 2020-02-21
Payer: COMMERCIAL

## 2020-02-21 PROCEDURE — 88142 CYTOPATH C/V THIN LAYER: CPT

## 2020-09-09 ENCOUNTER — HOSPITAL ENCOUNTER (OUTPATIENT)
Dept: LAB | Age: 55
Discharge: HOME | End: 2020-09-09
Payer: COMMERCIAL

## 2020-09-09 PROCEDURE — 88142 CYTOPATH C/V THIN LAYER: CPT

## 2021-03-10 ENCOUNTER — HOSPITAL ENCOUNTER (OUTPATIENT)
Dept: LAB | Age: 56
Discharge: HOME | End: 2021-03-10
Payer: COMMERCIAL

## 2021-03-10 PROCEDURE — 88142 CYTOPATH C/V THIN LAYER: CPT

## 2022-10-26 DIAGNOSIS — E78.2 MIXED HYPERLIPIDEMIA: Primary | ICD-10-CM
